# Patient Record
Sex: FEMALE | Race: WHITE | NOT HISPANIC OR LATINO | Employment: UNEMPLOYED | URBAN - METROPOLITAN AREA
[De-identification: names, ages, dates, MRNs, and addresses within clinical notes are randomized per-mention and may not be internally consistent; named-entity substitution may affect disease eponyms.]

---

## 2017-03-07 ENCOUNTER — OFFICE VISIT (OUTPATIENT)
Dept: RETAIL CLINIC | Facility: CLINIC | Age: 22
End: 2017-03-07

## 2017-03-07 VITALS
RESPIRATION RATE: 18 BRPM | DIASTOLIC BLOOD PRESSURE: 78 MMHG | SYSTOLIC BLOOD PRESSURE: 118 MMHG | TEMPERATURE: 98.5 F | HEART RATE: 104 BPM | OXYGEN SATURATION: 98 %

## 2017-03-07 DIAGNOSIS — H65.01 RIGHT ACUTE SEROUS OTITIS MEDIA, RECURRENCE NOT SPECIFIED: Primary | ICD-10-CM

## 2017-03-07 PROCEDURE — 99213 OFFICE O/P EST LOW 20 MIN: CPT | Performed by: NURSE PRACTITIONER

## 2017-03-07 RX ORDER — AMOXICILLIN 875 MG/1
875 TABLET, COATED ORAL 2 TIMES DAILY
Qty: 20 TABLET | Refills: 0 | Status: SHIPPED | OUTPATIENT
Start: 2017-03-07 | End: 2017-03-17

## 2017-03-07 NOTE — PROGRESS NOTES
Subjective   Aleida Cano is a 21 y.o. female. Presenting with left ear/fever (101) ache since last evening. Mentions that she began to have sore throat, and nasal congestion today. Has not taken any OTC medications for symptoms.     History of Present Illness     The following portions of the patient's history were reviewed and updated as appropriate: allergies, current medications, past family history, past medical history, past social history, past surgical history and problem list.    Review of Systems   Constitutional: Positive for fever (low grade). Negative for appetite change, chills, diaphoresis and fatigue.   HENT: Positive for congestion (nasal), ear pain (left ) and sore throat. Negative for postnasal drip and rhinorrhea.    Respiratory: Positive for cough (mild; mostly in evening).        Objective   Physical Exam   Constitutional: She appears well-developed and well-nourished.   HENT:   Head: Normocephalic.   Right Ear: Tympanic membrane is erythematous and bulging.   Left Ear: Tympanic membrane normal.   Nose: Mucosal edema and rhinorrhea present. Right sinus exhibits no maxillary sinus tenderness and no frontal sinus tenderness. Left sinus exhibits no maxillary sinus tenderness and no frontal sinus tenderness.   Moderate amount of cerumen present    Cardiovascular: Normal rate and regular rhythm.    Pulmonary/Chest: Effort normal and breath sounds normal.   Lymphadenopathy:        Head (right side): No submandibular adenopathy present.        Head (left side): No submandibular adenopathy present.     She has no cervical adenopathy.   Skin: Skin is warm, dry and intact.       Assessment/Plan   Aleida was seen today for earache, sore throat and nasal congestion.    Diagnoses and all orders for this visit:    Right acute serous otitis media, recurrence not specified    Other orders  -     loratadine-pseudoephedrine (CLARITIN-D 12 HOUR) 5-120 MG per 12 hr tablet; Take 1 tablet by mouth 2 (Two) Times  a Day for 5 days.  -     amoxicillin (AMOXIL) 875 MG tablet; Take 1 tablet by mouth 2 (Two) Times a Day for 10 days.

## 2017-06-28 ENCOUNTER — OFFICE VISIT (OUTPATIENT)
Dept: FAMILY MEDICINE CLINIC | Facility: CLINIC | Age: 22
End: 2017-06-28

## 2017-06-28 VITALS
HEIGHT: 63 IN | OXYGEN SATURATION: 98 % | BODY MASS INDEX: 51.91 KG/M2 | SYSTOLIC BLOOD PRESSURE: 128 MMHG | HEART RATE: 102 BPM | WEIGHT: 293 LBS | DIASTOLIC BLOOD PRESSURE: 76 MMHG

## 2017-06-28 DIAGNOSIS — Z30.41 ENCOUNTER FOR SURVEILLANCE OF CONTRACEPTIVE PILLS: Primary | ICD-10-CM

## 2017-06-28 DIAGNOSIS — Z23 NEED FOR HPV VACCINE: ICD-10-CM

## 2017-06-28 DIAGNOSIS — Z31.69 ENCOUNTER FOR PRECONCEPTION CONSULTATION: ICD-10-CM

## 2017-06-28 DIAGNOSIS — E66.01 MORBID OBESITY DUE TO EXCESS CALORIES (HCC): ICD-10-CM

## 2017-06-28 DIAGNOSIS — Z87.42 HISTORY OF IRREGULAR MENSTRUAL CYCLES: ICD-10-CM

## 2017-06-28 DIAGNOSIS — Z12.4 PAP SMEAR FOR CERVICAL CANCER SCREENING: ICD-10-CM

## 2017-06-28 LAB
ALBUMIN SERPL-MCNC: 4.2 G/DL (ref 3.2–4.8)
ALBUMIN/GLOB SERPL: 1.7 G/DL (ref 1.5–2.5)
ALP SERPL-CCNC: 132 U/L (ref 25–100)
ALT SERPL W P-5'-P-CCNC: 53 U/L (ref 7–40)
ANION GAP SERPL CALCULATED.3IONS-SCNC: 10 MMOL/L (ref 3–11)
ARTICHOKE IGE QN: 150 MG/DL (ref 0–130)
AST SERPL-CCNC: 37 U/L (ref 0–33)
B-HCG UR QL: NEGATIVE
BASOPHILS # BLD AUTO: 0.02 10*3/MM3 (ref 0–0.2)
BASOPHILS NFR BLD AUTO: 0.2 % (ref 0–1)
BILIRUB BLD-MCNC: NEGATIVE MG/DL
BILIRUB SERPL-MCNC: 0.4 MG/DL (ref 0.3–1.2)
BUN BLD-MCNC: <5 MG/DL (ref 9–23)
BUN/CREAT SERPL: ABNORMAL (ref 7–25)
CALCIUM SPEC-SCNC: 9.7 MG/DL (ref 8.7–10.4)
CHLORIDE SERPL-SCNC: 103 MMOL/L (ref 99–109)
CHOLEST SERPL-MCNC: 194 MG/DL (ref 0–200)
CLARITY, POC: CLEAR
CO2 SERPL-SCNC: 27 MMOL/L (ref 20–31)
COLOR UR: YELLOW
CREAT BLD-MCNC: 0.7 MG/DL (ref 0.6–1.3)
DEPRECATED RDW RBC AUTO: 49.2 FL (ref 37–54)
EOSINOPHIL # BLD AUTO: 0.25 10*3/MM3 (ref 0–0.3)
EOSINOPHIL NFR BLD AUTO: 2.9 % (ref 0–3)
ERYTHROCYTE [DISTWIDTH] IN BLOOD BY AUTOMATED COUNT: 14.4 % (ref 11.3–14.5)
FSH SERPL-ACNC: 6.2 MIU/ML
GFR SERPL CREATININE-BSD FRML MDRD: 104 ML/MIN/1.73
GLOBULIN UR ELPH-MCNC: 2.5 GM/DL
GLUCOSE BLD-MCNC: 97 MG/DL (ref 70–100)
GLUCOSE UR STRIP-MCNC: NEGATIVE MG/DL
HAV IGM SERPL QL IA: NORMAL
HBA1C MFR BLD: 5.3 % (ref 4.8–5.6)
HBV CORE IGM SERPL QL IA: NORMAL
HBV SURFACE AG SERPL QL IA: NORMAL
HCT VFR BLD AUTO: 44.8 % (ref 34.5–44)
HCV AB SER DONR QL: NORMAL
HDLC SERPL-MCNC: 41 MG/DL (ref 40–60)
HGB BLD-MCNC: 14.2 G/DL (ref 11.5–15.5)
HIV1+2 AB SER QL: NORMAL
IMM GRANULOCYTES # BLD: 0.01 10*3/MM3 (ref 0–0.03)
IMM GRANULOCYTES NFR BLD: 0.1 % (ref 0–0.6)
INTERNAL NEGATIVE CONTROL: NEGATIVE
INTERNAL POSITIVE CONTROL: POSITIVE
KETONES UR QL: NEGATIVE
LEUKOCYTE EST, POC: NEGATIVE
LH SERPL-ACNC: 8 MIU/ML
LYMPHOCYTES # BLD AUTO: 2.14 10*3/MM3 (ref 0.6–4.8)
LYMPHOCYTES NFR BLD AUTO: 24.9 % (ref 24–44)
Lab: 0
MCH RBC QN AUTO: 29.3 PG (ref 27–31)
MCHC RBC AUTO-ENTMCNC: 31.7 G/DL (ref 32–36)
MCV RBC AUTO: 92.4 FL (ref 80–99)
MONOCYTES # BLD AUTO: 0.53 10*3/MM3 (ref 0–1)
MONOCYTES NFR BLD AUTO: 6.2 % (ref 0–12)
NEUTROPHILS # BLD AUTO: 5.64 10*3/MM3 (ref 1.5–8.3)
NEUTROPHILS NFR BLD AUTO: 65.7 % (ref 41–71)
NITRITE UR-MCNC: NEGATIVE MG/ML
PH UR: 5 [PH] (ref 5–8)
PLATELET # BLD AUTO: 302 10*3/MM3 (ref 150–450)
PMV BLD AUTO: 10.3 FL (ref 6–12)
POTASSIUM BLD-SCNC: 4.5 MMOL/L (ref 3.5–5.5)
PROT SERPL-MCNC: 6.7 G/DL (ref 5.7–8.2)
PROT UR STRIP-MCNC: NEGATIVE MG/DL
RBC # BLD AUTO: 4.85 10*6/MM3 (ref 3.89–5.14)
RBC # UR STRIP: NEGATIVE /UL
SODIUM BLD-SCNC: 140 MMOL/L (ref 132–146)
SP GR UR: 1.02 (ref 1–1.03)
TRIGL SERPL-MCNC: 88 MG/DL (ref 0–150)
TSH SERPL DL<=0.05 MIU/L-ACNC: 5.33 MIU/ML (ref 0.35–5.35)
UROBILINOGEN UR QL: NORMAL
WBC NRBC COR # BLD: 8.59 10*3/MM3 (ref 3.5–10.8)

## 2017-06-28 PROCEDURE — 84443 ASSAY THYROID STIM HORMONE: CPT | Performed by: FAMILY MEDICINE

## 2017-06-28 PROCEDURE — 83002 ASSAY OF GONADOTROPIN (LH): CPT | Performed by: FAMILY MEDICINE

## 2017-06-28 PROCEDURE — 99214 OFFICE O/P EST MOD 30 MIN: CPT | Performed by: FAMILY MEDICINE

## 2017-06-28 PROCEDURE — 83036 HEMOGLOBIN GLYCOSYLATED A1C: CPT | Performed by: FAMILY MEDICINE

## 2017-06-28 PROCEDURE — 82627 DEHYDROEPIANDROSTERONE: CPT | Performed by: FAMILY MEDICINE

## 2017-06-28 PROCEDURE — 90649 4VHPV VACCINE 3 DOSE IM: CPT | Performed by: FAMILY MEDICINE

## 2017-06-28 PROCEDURE — 81025 URINE PREGNANCY TEST: CPT | Performed by: FAMILY MEDICINE

## 2017-06-28 PROCEDURE — 80061 LIPID PANEL: CPT | Performed by: FAMILY MEDICINE

## 2017-06-28 PROCEDURE — 83001 ASSAY OF GONADOTROPIN (FSH): CPT | Performed by: FAMILY MEDICINE

## 2017-06-28 PROCEDURE — 90471 IMMUNIZATION ADMIN: CPT | Performed by: FAMILY MEDICINE

## 2017-06-28 PROCEDURE — 85025 COMPLETE CBC W/AUTO DIFF WBC: CPT | Performed by: FAMILY MEDICINE

## 2017-06-28 PROCEDURE — 80053 COMPREHEN METABOLIC PANEL: CPT | Performed by: FAMILY MEDICINE

## 2017-06-28 PROCEDURE — 81003 URINALYSIS AUTO W/O SCOPE: CPT | Performed by: FAMILY MEDICINE

## 2017-06-28 PROCEDURE — 84402 ASSAY OF FREE TESTOSTERONE: CPT | Performed by: FAMILY MEDICINE

## 2017-06-28 PROCEDURE — 80074 ACUTE HEPATITIS PANEL: CPT | Performed by: FAMILY MEDICINE

## 2017-06-28 PROCEDURE — G0432 EIA HIV-1/HIV-2 SCREEN: HCPCS | Performed by: FAMILY MEDICINE

## 2017-06-28 PROCEDURE — 86592 SYPHILIS TEST NON-TREP QUAL: CPT | Performed by: FAMILY MEDICINE

## 2017-06-28 RX ORDER — NORGESTIMATE AND ETHINYL ESTRADIOL 0.25-0.035
1 KIT ORAL DAILY
Qty: 28 TABLET | Refills: 3 | Status: SHIPPED | OUTPATIENT
Start: 2017-06-28

## 2017-06-28 NOTE — PROGRESS NOTES
"Subjective   Aleida Cano is a 22 y.o. female.  Pt is here for refills on birth control. No previous pap.    Contraception   This is a recurrent (initial rx 1 year ago,  did not follow up for recommended pap at 6 months.  No prior pap.  no hx blood clot.  No tobacco. ) problem. The current episode started more than 1 year ago. The problem occurs rarely. The problem has been resolved. Pertinent negatives include no abdominal pain, arthralgias, change in bowel habit, congestion, diaphoresis, fever, headaches, numbness, rash, swollen glands or weakness. Nothing aggravates the symptoms. She has tried nothing for the symptoms. The treatment provided moderate relief.      She was started on ocp 2013 after HS graduation due to heavy frequent and irreg menses.  She would bleed for weeks then go without menses for months. shayan well.      She reports interest in conceiving.  Specifically she asks if she desires pregnancy if she needs to dc ocp.  Discussed and counseled on menstrual cycle,when ovulation occurs, and Sx PCOS.      The following portions of the patient's history were reviewed and updated as appropriate: allergies, current medications, past family history, past medical history, past social history, past surgical history and problem list.    Review of Systems   Constitutional: Negative for diaphoresis and fever.   HENT: Negative for congestion.    Gastrointestinal: Negative for abdominal pain and change in bowel habit.   Musculoskeletal: Negative for arthralgias.   Skin: Negative for rash.   Neurological: Negative for weakness, numbness and headaches.       Objective     Vitals:    06/28/17 1031   BP: 128/76   Pulse: 102   SpO2: 98%   Weight: (!) 301 lb (137 kg)   Height: 63\" (160 cm)       Physical Exam   Constitutional: She is oriented to person, place, and time. She appears well-developed and well-nourished.   Obese, nad   HENT:   Head: Normocephalic and atraumatic.   Eyes: EOM are normal. Pupils are equal, " round, and reactive to light. Right eye exhibits no discharge. Left eye exhibits no discharge.   Neck: Normal range of motion. Neck supple.   Cardiovascular: Normal rate, regular rhythm, normal heart sounds and intact distal pulses.    Pulmonary/Chest: Effort normal and breath sounds normal.   Abdominal: Soft. Bowel sounds are normal. She exhibits no mass. There is no tenderness.   Musculoskeletal: Normal range of motion.        Right shoulder: She exhibits no swelling.   Neurological: She is alert and oriented to person, place, and time. She has normal reflexes.   Skin: Skin is warm and dry. No cyanosis. Nails show no clubbing.   Psychiatric: She has a normal mood and affect. Her behavior is normal. Judgment and thought content normal.   Nursing note and vitals reviewed.      Assessment/Plan     Problem List Items Addressed This Visit        Digestive    Morbid obesity    Current Assessment & Plan     Obesity is unchanged.  Discussed the patient's BMI.  The BMI is above average; BMI management plan is completed.  General weight loss/lifestyle modification strategies discussed (elicit support from others; identify saboteurs; non-food rewards, etc).         Relevant Orders    CBC & Differential    TSH    Comprehensive Metabolic Panel    Hemoglobin A1c    Lipid Panel    HIV-1 / O / 2 Ag / Antibody 4th Generation    Hepatitis Panel, Acute    RPR    Follicle stimulating hormone    DHEA-sulfate    Luteinizing hormone    Testosterone Free Direct    CBC Auto Differential       Other    Encounter for surveillance of contraceptive pills - Primary    Relevant Medications    norgestimate-ethinyl estradiol (SPRINTEC 28) 0.25-35 MG-MCG per tablet    Other Relevant Orders    Ambulatory Referral to Gynecology    HPV Vaccine QuadriValent 3 Dose IM (Completed)    History of irregular menstrual cycles    Relevant Orders    CBC & Differential    TSH    Comprehensive Metabolic Panel    Hemoglobin A1c    Lipid Panel    HIV-1 / O / 2 Ag  / Antibody 4th Generation    Hepatitis Panel, Acute    RPR    Follicle stimulating hormone    DHEA-sulfate    Luteinizing hormone    Testosterone Free Direct    POC Urinalysis Dipstick, Automated (Completed)    POC Pregnancy, Urine (Completed)    HPV Vaccine QuadriValent 3 Dose IM (Completed)    CBC Auto Differential    Encounter for preconception consultation    Relevant Medications    Prenatal-FE Bis-FA-DHA w/o A (COMPLETE PRENATAL/DHA) 30-0.975 & 300 MG misc    Other Relevant Orders    HIV-1 / O / 2 Ag / Antibody 4th Generation    Hepatitis Panel, Acute    RPR    Pap smear for cervical cancer screening    Current Assessment & Plan     Refer gyn.           Relevant Orders    HPV Vaccine QuadriValent 3 Dose IM (Completed)    Need for HPV vaccine    Overview     #1 6/28/17             Greater than 30 minutes were spent with patient, with greater than 90% of time spent in face-to-face communication.  Refer gyn for initial pap.  Labs for pcos and obesity.   She is fasting today  Start PNV.  Avoid tobacco, etoh, nsaids.   HPV #1 today-fu 1-2 mo and 6 mo for injections #2 and #3  Fu 2 mo for gardasil #2 and Annual Physical.

## 2017-06-28 NOTE — PATIENT INSTRUCTIONS
Prenatal Vitamin and Mineral Combinations (oral solid dosage forms)  What is this medicine?  PRENATAL VITAMIN AND MINERAL combinations are used before, during, and after pregnancy to help provide provide good nutrition.  This medicine may be used for other purposes; ask your health care provider or pharmacist if you have questions.  COMMON BRAND NAME(S): Active OB, Advanced Care Plus, Advanced NatalCare, Advanced-RF NatalCare, Aminate Fe, Anemagen OB, B-Nexa, BP FoliNatal Plus B, BP MultiNatal Plus, BP MultiNatal Plus Chewable, BP Prenate, Brainstrong, Bright Beginnings Prenatal, Tito-Kain, Calcium PNV, CareNatal DHA, CareNate 600, Cavan One Dixon, Cavan Prenatal with EC Calcium, Cavan-Alpha, Cavan-EC SOD DHA, Cavan-Heme OB, Cavan-Heme Omega, Cenogen Ultra, Centrum Specialist Prenatal, CertaVite with Antioxidants, Choice-OB + DHA, Citracal Prenatal, Citracal Prenatal + DHA, CitraNatal 90 DHA, CitraNatal Assure, CitraNatal B-Calm, CitraNatal DHA, CitraNatal Drake, CitraNatal Rx, Classic Prenatal, ComBi Rx, Complete Sejal DHA, Complete-RF, CompleteNate, Concept DHA, Concept OB, CoreNate-DHA, Corvite FE with Quatrefolic, CRNatal DHA, Daily Vitamin, Docosavit, Duet, Duet Chewable, Duet DHA, Duet , Duet DHA 430ec, Duet DHA Balanced, Duet DHA Complete, Duet DHA Complete Gluten Free, Duet DHA EC, Duet DHA Ferrazone, EC Omega-3, DuoVit DHA, Edge OB, Elite OB, Elite OB with DHA, Elite-, Extra-Virt Plus, Femecal OB, Femecal OB Plus DHA, Ferrocite Plus, Focalgin 90 DHA, Focalgin CA, Folbecal, Folcal DHA, Folcaps Care One, Folcaps Omega 3, Folet One with Quatrefolic, Folivane OB, Folivane-EC Calcium DHA NF, Foltabs 90 Plus DHA, Foltabs Prenatal, Foltabs Prenatal Plus DHA, Gesticare, Gesticare DHA, Gesticare DHA Delayed-Release, HemeNatal OB, HemeNatal OB + DHA, Hemocyte Plus, HIP Prenatal, ICAR Prenatal Rx, iNatal Advance, iNatal GT, iNatal Ultra, Infanate Balance, Infanate DHA, Infanate Plus, Kolnatal,  Lactocal-F, Levomefolate PNV, MACNATAL CN DHA, Marnatal-F, Marnatal-F Plus, Materna, Maxinate, [place] Prenatal, [place] Prenatal F.A., [place] Prenatal H.P., Mom's Choice Rx, Multi-Kain 30, Multi-Kain 30 DHA, Multi-Kain DHA Extra, Multifol Plus, Multivitamin With Minerals, MyNatal OB Prenatal, NataCaps, NataChew, NataFolic-OB, Natafort, -V RX, NatalCare CFe 60, NatalCare GlossTabs, NatalCare PIC, NatalCare PIC Forte, NatalCare Plus, NatalCare Rx, NatalCare Three, NATALVIRT 90 DHA, NATALVIRT CA, NataTab CFe, NataTab FA, Natatab Rx, Natelle, Natelle C, Natelle One, Natelle Plus with DHA, Natelle Prefer, Natelle-ez, Navatab + DHA, Neevo, Neevo DHA, Nestabs, Nestabs ABC, Nestabs CBF, Nestabs DHA, Nestabs FA, Nestabs Rx, New Advanced Formula Prenatal Z, Nexa Plus, Nexa Select, Niferex-PN, Niferex-PN Forte, Niva-Plus, NovaNatal, NovaStart, Nu-Sejal, NutraCare, Nutri-Tab OB, Nutri-Tab OB + DHA, Nutrinate, NutriSpire, O-Tito F.A., O-Tito Prenatal, OB Choice, OB Complete, OB Complete 400, OB Complete One, OB Complete Petite, OB Complete Premier, OB Complete with DHA, OB- One, Obstetrix EC Prenatal, Obstetrix-100, Obtrex, Obtrex DHA, One-A-Day Women's, One-A-Day Women's Prenatal, OptiNate, Paire OB Tablet Plus DHA, PNV OB + DHA, PNV Prenatal, PNV Prenatal Plus Multivitamin, PNV Tabs 29-1, PNV-DHA, PNV-DHA + Docusate, PNV-DHA Plus, PNV-First, PNV-Iron, PNV-OB with DHA, PNV-Omega, PNV-Select, PNV-Total with DHA, PNV--U, NJ Sejal 400, NJ  400ec, NJ  430, NJ Sejal 430ec, NJ Sejal 440ec, PreCare, PreferaOB, PreferaOB + DHA, PreferaOB One, Premesis Rx, Prena1 MANINDER, Prena1 Plus, Prena1 True, PrenaCare, Prenafirst, Prenaissance, Prenaissance 90 DHA, Prenaissance Balance, Prenaissance DHA, Prenaissance [place] DHA, Prenaissance Next, Prenaissance Plus, Prenaissance Promise, PrenaPlus, Prenat with Quatrefolic, PreNata Multivitamin with Iron, Prenatabs CBF, Prenatabs FA, Prenatabs OBN, Prenatabs RX, Prenatal,  Prenatal 1 Plus 1, Prenatal 19, Prenatal AD, Prenatal Formula 3, Prenatal H, Prenatal Low Iron, Prenatal MR 90 Fe, Prenatal MTR with Selenium, Prenatal Multivitamin + DHA 2, Prenatal Nina Advance, Prenatal Plus, Prenatal Plus Iron, Prenatal Plus Low Iron, Prenatal Rx with Beta Carotene, Prenatal S, Prenatal U, Prenatal Vitamin, PreNatal Vitamins Plus, Prenate Advance, Prenate AM with Quatrefolic, Prenate DHA, Prenate Elite, Prenate Enhance with Quatrefolic, Prenate Essential, Prenate GT, Prenate Mini, PreNate Plus, Prenate Restore with Quatrefolic, Prenate Star, Prenate Ultra, Prenavite, Prenavite Protein, PreNexa, PreNexa Premier, PrePLUS, PreQue, PreTAB, Previte Rx, PrimaCare, PrimaCare Advantage, PrimaCare ONE, Provida OB, PruEt DHA, PruEt DHAec, PureFe OB Plus, PureFe Plus, PureVit DualFe Plus, RE DualVit OB, RE DualVit Plus, RE OB + DHA, RE OB 90 + DHA, RE Prenatal, RE PreVit + DHA, RE-Selena 29, RE-Selena 29 OB, Reaphrim, Liliam, Liliam DHA, Liliam DHA Extra, REocyte Plus, Right Step, Dudley-Nv, Dudley-Nv DHA, Rulavite, Se-Care, Se-Care Conceive, Se-Care Gesture, Se-Sejal 19, Se-Sejal 19 Chewable, Se- 90, Se-Sejal ONE, Se-Plete DHA, Se-Tan DHA, Se-Tan Plus, Select-OB, Select-OB + DHA, SetonET, SetonET-EC DHA, StrongStart, StrongStart Chewable Tablet, Atilio One, Atilio Prenatal + DHA, Stuartnatal Plus 3, Tandem DHA, Tandem OB, Tandem Plus, Jesus A Prenatal Pack with DHA, Jesus EC Calcium DHA Pack, Jesus Prenatal with DHA, Jesus-C DHA, Jesus-EC Tito, Jesus-Prex Prenatal with DHA, Thera  Complete, Thera Sejal Core Nutrition, Thera  Lactation Support, Thera  OvaVite, Thera  Plus, Thera-Tabs, Thrivite, TL-Care DHA, TL-Select, TL-Select DHA, Tri Rx, TriAdvance, ,  Prenatal DHA ONE, Trifera OB, Trimesis Rx, Trinatal GT, Trinatal Rx 1, Trinate, TriStart DHA, Triveen-Duo DHA, Triveen-PRx RNF, Triveen-Ten, Trust Sejal DHA, UltimateCare Advantage, UltimateCare Combo, UltimateCare ONE,  UltimateCare ONE NF, Ultra NatalCare, VemaVite-PRx 2, Vena-Bal DHA, Venatal-FA, Verotin-BY, Verotin-GR, Vinacal, Vinacal B, Vinatal Forte, Vinate 90, Vinate AZ, Vinate AZ Extra, Vinate C, Vinate Calcium, Vinate Care, Vinate DHA, Vinate GT, Vinate IC, Vinate II, Vinate III, Vinate M Low Iron, Vinate One, Vinate PN, Vinate Ultra, Virt-Advance, Virt-C DHA, Virt-Care One, Virt-Kain, Virt-PN, Virt-PN DHA, Virt-PN Plus, Virt-Select, Virt-Patrizia GT, VirtPrex, Vitafol PN, Vitafol Ultra, Vitafol-Joceline Prenatal, Vitafol-OB, Vitafol-OB + DHA, Vitafol-OB and DHA, Vitafol-One, VitaMed MD Plus Rx, VitaNatal OB Plus DHA, vitaPearl Prenatal, VitaPhil, VitaPhil + DHA, VitaPhil + DHA 90, VitaPhil AiDE, VitaSpire, Viva CT, VIVA DHA, Vol-Tab Rx,  CH Ultra, -CH-PNV, -GGR-B6 Prenatal, -HEME One, -PNV-DHA, Zatean-CH, Zatean-Pn, Zatean-Pn DHA, Zatean-Pn Plus, Zingiber  What should I tell my health care provider before I take this medicine?  They need to know if you have any of these conditions:  -bleeding or clotting disorder  -history of anemia of any type  -other chronic health condition  -an unusual or allergic reaction to vitamins, minerals, other medicines, foods, dyes, or preservatives  How should I use this medicine?  Take this medicine by mouth with a glass of water. You can take it with or without food. If it upsets your stomach, take it with food. Chewable prenatal vitamin tablets may be chewed completely before swallowing. Follow the directions on the prescription label. The usual dose is taken once a day. Do not take your medicine more often than directed.  Contact your pediatrician regarding the use of this medicine in children. Special care may be needed. This medicine is intended for females who are pregnant, breast-feeding, or may become pregnant.  Overdosage: If you think you have taken too much of this medicine contact a poison control center or emergency room at once.  NOTE: This medicine is only for you. Do not  share this medicine with others.  What if I miss a dose?  If you miss a dose, take it as soon as you can. If it is almost time for your next dose, take only that dose. Do not take double or extra doses.  What may interact with this medicine?  -alendronate  -antacids  -cefdinir  -cefditoren  -etidronate  -fluoroquinolone antibiotics (examples: ciprofloxacin, gatifloxacin, levofloxacin)  -ibandronate  -levodopa  -risedronate  -tetracycline antibiotics (examples: doxycycline, minocycline, tetracycline)  -thyroid hormones  -warfarin  This list may not describe all possible interactions. Give your health care provider a list of all the medicines, herbs, non-prescription drugs, or dietary supplements you use. Also tell them if you smoke, drink alcohol, or use illegal drugs. Some items may interact with your medicine.  What should I watch for while using this medicine?  See your health care professional for regular checks on your progress. Remember that vitamin and mineral supplements do not replace the need for good nutrition from a balanced diet.  Stools commonly change color when vitamins and minerals are taken. Notify your health care professional if this change is alarming or accompanied by other symptoms, like abdominal pain.  What side effects may I notice from receiving this medicine?  Side effects that you should report to your doctor or health care professional as soon as possible:  -allergic reaction such as skin rash or difficulty breathing  -vomiting  Side effects that usually do not require medical attention (report to your doctor or health care professional if they continue or are bothersome):  -nausea  -stomach upset  This list may not describe all possible side effects. Call your doctor for medical advice about side effects. You may report side effects to FDA at 9-832-FDA-2147.  Where should I keep my medicine?  Keep out of the reach of children.  Most vitamins and minerals should be stored at controlled  room temperature. Check your specific product directions. Protect from heat and moisture. Throw away any unused medicine after the expiration date.  NOTE: This sheet is a summary. It may not cover all possible information. If you have questions about this medicine, talk to your doctor, pharmacist, or health care provider.     © 2017, [employer]. (2016-07-14 09:02:38)  Prenatal Care  WHAT IS PRENATAL CARE?   Prenatal care is the process of caring for a pregnant woman before she gives birth. Prenatal care makes sure that she and her baby remain as healthy as possible throughout pregnancy. Prenatal care may be provided by a midwife, family practice health care provider, or a childbirth and pregnancy specialist (obstetrician). Prenatal care may include physical examinations, testing, treatments, and education on nutrition, lifestyle, and social support services.  WHY IS PRENATAL CARE SO IMPORTANT?   Early and consistent prenatal care increases the chance that you and your baby will remain healthy throughout your pregnancy. This type of care also decreases a baby's risk of being born too early (prematurely), or being born smaller than expected (small for gestational age). Any underlying medical conditions you may have that could pose a risk during your pregnancy are discussed during prenatal care visits. You will also be monitored regularly for any new conditions that may arise during your pregnancy so they can be treated quickly and effectively.  WHAT HAPPENS DURING PRENATAL CARE VISITS?  Prenatal care visits may include the following:  Discussion  Tell your health care provider about any new signs or symptoms you have experienced since your last visit. These might include:  · Nausea or vomiting.  · Increased or decreased level of energy.  · Difficulty sleeping.  · Back or leg pain.  · Weight changes.  · Frequent urination.  · Shortness of breath with physical activity.  · Changes in your skin, such as the  development of a rash or itchiness.  · Vaginal discharge or bleeding.  · Feelings of excitement or nervousness.  · Changes in your baby's movements.  You may want to write down any questions or topics you want to discuss with your health care provider and bring them with you to your appointment.  Examination  During your first prenatal care visit, you will likely have a complete physical exam. Your health care provider will often examine your vagina, cervix, and the position of your uterus, as well as check your heart, lungs, and other body systems. As your pregnancy progresses, your health care provider will measure the size of your uterus and your baby's position inside your uterus. He or she may also examine you for early signs of labor. Your prenatal visits may also include checking your blood pressure and, after about 10-12 weeks of pregnancy, listening to your baby's heartbeat.  Testing  Regular testing often includes:  · Urinalysis. This checks your urine for glucose, protein, or signs of infection.  · Blood count. This checks the levels of white and red blood cells in your body.  · Tests for sexually transmitted infections (STIs). Testing for STIs at the beginning of pregnancy is routinely done and is required in many states.  · Antibody testing. You will be checked to see if you are immune to certain illnesses, such as rubella, that can affect a developing fetus.  · Glucose screen. Around 24-28 weeks of pregnancy, your blood glucose level will be checked for signs of gestational diabetes. Follow-up tests may be recommended.  · Group B strep. This is a bacteria that is commonly found inside a woman's vagina. This test will inform your health care provider if you need an antibiotic to reduce the amount of this bacteria in your body prior to labor and childbirth.  · Ultrasound. Many pregnant women undergo an ultrasound screening around 18-20 weeks of pregnancy to evaluate the health of the fetus and check for  any developmental abnormalities.  · HIV (human immunodeficiency virus) testing. Early in your pregnancy, you will be screened for HIV. If you are at high risk for HIV, this test may be repeated during your third trimester of pregnancy.  You may be offered other testing based on your age, personal or family medical history, or other factors.   HOW OFTEN SHOULD I PLAN TO SEE MY HEALTH CARE PROVIDER FOR PRENATAL CARE?  Your prenatal care check-up schedule depends on any medical conditions you have before, or develop during, your pregnancy. If you do not have any underlying medical conditions, you will likely be seen for checkups:  · Monthly, during the first 6 months of pregnancy.  · Twice a month during months 7 and 8 of pregnancy.  · Weekly starting in the 9th month of pregnancy and until delivery.  If you develop signs of early labor or other concerning signs or symptoms, you may need to see your health care provider more often. Ask your health care provider what prenatal care schedule is best for you.  WHAT CAN I DO TO KEEP MYSELF AND MY BABY AS HEALTHY AS POSSIBLE DURING MY PREGNANCY?  · Take a prenatal vitamin containing 400 micrograms (0.4 mg) of folic acid every day. Your health care provider may also ask you to take additional vitamins such as iodine, vitamin D, iron, copper, and zinc.  · Take 6717-2323 mg of calcium daily starting at your 20th week of pregnancy until you deliver your baby.  · Make sure you are up to date on your vaccinations. Unless directed otherwise by your health care provider:    You should receive a tetanus, diphtheria, and pertussis (Tdap) vaccination between the 27th and 36th week of your pregnancy, regardless of when your last Tdap immunization occurred. This helps protect your baby from whooping cough (pertussis) after he or she is born.    You should receive an annual inactivated influenza vaccine (IIV) to help protect you and your baby from influenza. This can be done at any point  during your pregnancy.  · Eat a well-rounded diet that includes:    Fresh fruits and vegetables.    Lean proteins.    Calcium-rich foods such as milk, yogurt, hard cheeses, and dark, leafy greens.    Whole grain breads.  · Do not eat seafood high in mercury, including:    Swordfish.    Tilefish.    Shark.    [place] mackerel.    More than 6 oz tuna per week.  · Do not eat:    Raw or undercooked meats or eggs.    Unpasteurized foods, such as soft cheeses (brie, blue, or feta), juices, and milks.    Lunch meats.    Hot dogs that have not been heated until they are steaming.  · Drink enough water to keep your urine clear or pale yellow. For many women, this may be 10 or more 8 oz glasses of water each day. Keeping yourself hydrated helps deliver nutrients to your baby and may prevent the start of pre-term uterine contractions.  · Do not use any tobacco products including cigarettes, chewing tobacco, or electronic cigarettes. If you need help quitting, ask your health care provider.  · Do not drink beverages containing alcohol. No safe level of alcohol consumption during pregnancy has been determined.  · Do not use any illegal drugs. These can harm your developing baby or cause a miscarriage.  · Ask your health care provider or pharmacist before taking any prescription or over-the-counter medicines, herbs, or supplements.  · Limit your caffeine intake to no more than 200 mg per day.  · Exercise. Unless told otherwise by your health care provider, try to get 30 minutes of moderate exercise most days of the week. Do not  do high-impact activities, contact sports, or activities with a high risk of falling, such as horseback riding or downhill skiing.  · Get plenty of rest.  · Avoid anything that raises your body temperature, such as hot tubs and saunas.  · If you own a cat, do not empty its litter box. Bacteria contained in cat feces can cause an infection called toxoplasmosis. This can result in serious harm to the  fetus.  · Stay away from chemicals such as insecticides, lead, mercury, and cleaning or paint products that contain solvents.  · Do not have any X-rays taken unless medically necessary.  · Take a childbirth and breastfeeding preparation class. Ask your health care provider if you need a referral or recommendation.     This information is not intended to replace advice given to you by your health care provider. Make sure you discuss any questions you have with your health care provider.     Document Released: 12/20/2004 Document Revised: 04/10/2017 Document Reviewed: 03/04/2015  [employer] Interactive Patient Education ©2017 [employer]  Oral Contraception Information  Oral contraceptive pills (OCPs) are medicines taken to prevent pregnancy. OCPs work by preventing the ovaries from releasing eggs. The hormones in OCPs also cause the cervical mucus to thicken, preventing the sperm from entering the uterus. The hormones also cause the uterine lining to become thin, not allowing a fertilized egg to attach to the inside of the uterus. OCPs are highly effective when taken exactly as prescribed. However, OCPs do not prevent sexually transmitted diseases (STDs). Safe sex practices, such as using condoms along with the pill, can help prevent STDs.   Before taking the pill, you may have a physical exam and Pap test. Your health care provider may order blood tests. The health care provider will make sure you are a good candidate for oral contraception. Discuss with your health care provider the possible side effects of the OCP you may be prescribed. When starting an OCP, it can take 2 to 3 months for the body to adjust to the changes in hormone levels in your body.   TYPES OF ORAL CONTRACEPTION  · The combination pill--This pill contains estrogen and progestin (synthetic progesterone) hormones. The combination pill comes in 21-day, 28-day, or 91-day packs. Some types of combination pills are meant to be taken continuously  (365-day pills). With 21-day packs, you do not take pills for 7 days after the last pill. With 28-day packs, the pill is taken every day. The last 7 pills are without hormones. Certain types of pills have more than 21 hormone-containing pills. With 91-day packs, the first 84 pills contain both hormones, and the last 7 pills contain no hormones or contain estrogen only.  · The minipill--This pill contains the progesterone hormone only. The pill is taken every day continuously. It is very important to take the pill at the same time each day. The minipill comes in packs of 28 pills. All 28 pills contain the hormone.    ADVANTAGES OF ORAL CONTRACEPTIVE PILLS  · Decreases premenstrual symptoms.    · Treats menstrual period cramps.    · Regulates the menstrual cycle.    · Decreases a heavy menstrual flow.    · May treat acne, depending on the type of pill.    · Treats abnormal uterine bleeding.    · Treats polycystic ovarian syndrome.    · Treats endometriosis.    · Can be used as emergency contraception.    THINGS THAT CAN MAKE ORAL CONTRACEPTIVE PILLS LESS EFFECTIVE  OCPs can be less effective if:   · You forget to take the pill at the same time every day.    · You have a stomach or intestinal disease that lessens the absorption of the pill.    · You take OCPs with other medicines that make OCPs less effective, such as antibiotics, certain HIV medicines, and some seizure medicines.    · You take  OCPs.    · You forget to restart the pill on day 7, when using the packs of 21 pills.    RISKS ASSOCIATED WITH ORAL CONTRACEPTIVE PILLS   Oral contraceptive pills can sometimes cause side effects, such as:  · Headache.  · Nausea.  · Breast tenderness.  · Irregular bleeding or spotting.  Combination pills are also associated with a small increased risk of:  · Blood clots.  · Heart attack.  · Stroke.     This information is not intended to replace advice given to you by your health care provider. Make sure you discuss any  questions you have with your health care provider.     Document Released: 03/09/2004 Document Revised: 04/10/2017 Document Reviewed: 06/08/2014  [employer] Interactive Patient Education ©2017 [employer]  Polycystic Ovarian Syndrome  Polycystic ovarian syndrome (PCOS) is a common hormonal disorder among women of reproductive age. Most women with PCOS grow many small cysts on their ovaries. PCOS can cause problems with your periods and make it difficult to get pregnant. It can also cause an increased risk of miscarriage with pregnancy. If left untreated, PCOS can lead to serious health problems, such as diabetes and heart disease.  CAUSES  The cause of PCOS is not fully understood, but genetics may be a factor.  SIGNS AND SYMPTOMS   · Infrequent or no menstrual periods.    · Inability to get pregnant (infertility) because of not ovulating.    · Increased growth of hair on the face, chest, stomach, back, thumbs, thighs, or toes.    · Acne, oily skin, or dandruff.    · Pelvic pain.    · Weight gain or obesity, usually carrying extra weight around the waist.    · Type 2 diabetes.     · High cholesterol.    · High blood pressure.    · Female-pattern baldness or thinning hair.    · Patches of thickened and dark brown or black skin on the neck, arms, breasts, or thighs.    · Tiny excess flaps of skin (skin tags) in the armpits or neck area.    · Excessive snoring and having breathing stop at times while asleep (sleep apnea).    · Deepening of the voice.    · Gestational diabetes when pregnant.    DIAGNOSIS   There is no single test to diagnose PCOS.   · Your health care provider will:      Take a medical history.      Perform a pelvic exam.      Have ultrasonography done.      Check your female and male hormone levels.      Measure glucose or sugar levels in the blood.      Do other blood tests.    · If you are producing too many male hormones, your health care provider will make sure it is from PCOS. At the physical exam,  your health care provider will want to evaluate the areas of increased hair growth. Try to allow natural hair growth for a few days before the visit.    · During a pelvic exam, the ovaries may be enlarged or swollen because of the increased number of small cysts. This can be seen more easily by using vaginal ultrasonography or screening to examine the ovaries and lining of the uterus (endometrium) for cysts. The uterine lining may become thicker if you have not been having a regular period.    TREATMENT   Because there is no cure for PCOS, it needs to be managed to prevent problems. Treatments are based on your symptoms. Treatment is also based on whether you want to have a baby or whether you need contraception.   Treatment may include:   · Progesterone hormone to start a menstrual period.    · Birth control pills to make you have regular menstrual periods.    · Medicines to make you ovulate, if you want to get pregnant.    · Medicines to control your insulin.    · Medicine to control your blood pressure.    · Medicine and diet to control your high cholesterol and triglycerides in your blood.  · Medicine to reduce excessive hair growth.   · Surgery, making small holes in the ovary, to decrease the amount of male hormone production. This is done through a long, lighted tube (laparoscope) placed into the pelvis through a tiny incision in the lower abdomen.    HOME CARE INSTRUCTIONS  · Only take over-the-counter or prescription medicine as directed by your health care provider.  · Pay attention to the foods you eat and your activity levels. This can help reduce the effects of PCOS.    Keep your weight under control.    Eat foods that are low in carbohydrate and high in fiber.    Exercise regularly.  SEEK MEDICAL CARE IF:  · Your symptoms do not get better with medicine.  · You have new symptoms.     This information is not intended to replace advice given to you by your health care provider. Make sure you discuss any  questions you have with your health care provider.     Document Released: 04/13/2006 Document Revised: 10/08/2014 Document Reviewed: 06/05/2014  [employer] Interactive Patient Education ©2017 [employer]  HPV Vaccine Gardasil® (Human Papillomavirus): What You Need to Know  1. What is HPV?  Genital human papillomavirus (HPV) is the most common sexually transmitted virus in the [place]. More than half of sexually active men and women are infected with HPV at some time in their lives.  About 20 million Americans are currently infected, and about 6 million more get infected each year. HPV is usually spread through sexual contact.  Most HPV infections don't cause any symptoms, and go away on their own. But HPV can cause cervical cancer in women. Cervical cancer is the 2nd leading cause of cancer deaths among women around the world. In the [place], about 12,000 women get cervical cancer every year and about 4,000 are expected to die from it.  HPV is also associated with several less common cancers, such as vaginal and vulvar cancers in women, and anal and oropharyngeal (back of the throat, including base of tongue and tonsils) cancers in both men and women. HPV can also cause genital warts and warts in the throat.  There is no cure for HPV infection, but some of the problems it causes can be treated.  2. HPV vaccine: Why get vaccinated?  The HPV vaccine you are getting is one of two vaccines that can be given to prevent HPV. It may be given to both males and females.   This vaccine can prevent most cases of cervical cancer in females, if it is given before exposure to the virus. In addition, it can prevent vaginal and vulvar cancer in females, and genital warts and anal cancer in both males and females.  Protection from HPV vaccine is expected to be long-lasting. But vaccination is not a substitute for cervical cancer screening. Women should still get regular Pap tests.  3. Who should get this HPV vaccine and  when?  HPV vaccine is given as a 3-dose series  · 1st Dose: Now  · 2nd Dose: 1 to 2 months after Dose 1  · 3rd Dose: 6 months after Dose 1  Additional (booster) doses are not recommended.  Routine vaccination  · This HPV vaccine is recommended for girls and boys 11 or 12 years of age. It may be given starting at age 9.  Why is HPV vaccine recommended at 11 or 12 years of age?   HPV infection is easily acquired, even with only one sex partner. That is why it is important to get HPV vaccine before any sexual contact takes place. Also, response to the vaccine is better at this age than at older ages.  Catch-up vaccination  This vaccine is recommended for the following people who have not completed the 3-dose series:   · Females 13 through 26 years of age.  · Males 13 through 21 years of age.  This vaccine may be given to men 22 through 26 years of age who have not completed the 3-dose series.  It is recommended for men through age 26 who have sex with men or whose immune system is weakened because of HIV infection, other illness, or medications.   HPV vaccine may be given at the same time as other vaccines.  4. Some people should not get HPV vaccine or should wait.  · Anyone who has ever had a life-threatening allergic reaction to any component of HPV vaccine, or to a previous dose of HPV vaccine, should not get the vaccine. Tell your doctor if the person getting vaccinated has any severe allergies, including an allergy to yeast.  · HPV vaccine is not recommended for pregnant women. However, receiving HPV vaccine when pregnant is not a reason to consider terminating the pregnancy. Women who are breast feeding may get the vaccine.  · People who are mildly ill when a dose of HPV is planned can still be vaccinated. People with a moderate or severe illness should wait until they are better.  5. What are the risks from this vaccine?  This HPV vaccine has been used in the [place] and around the world for about six years and has  been very safe.  However, any medicine could possibly cause a serious problem, such as a severe allergic reaction. The risk of any vaccine causing a serious injury, or death, is extremely small.  Life-threatening allergic reactions from vaccines are very rare. If they do occur, it would be within a few minutes to a few hours after the vaccination.  Several mild to moderate problems are known to occur with this HPV vaccine. These do not last long and go away on their own.  · Reactions in the arm where the shot was given:    Pain (about 8 people in 10)    Redness or swelling (about 1 person in 4)  · Fever:    Mild (100° F) (about 1 person in 10)    Moderate (102° F) (about 1 person in 65)  · Other problems:    Headache (about 1 person in 3)  · Fainting: Brief fainting spells and related symptoms (such as jerking movements) can happen after any medical procedure, including vaccination. Sitting or lying down for about 15 minutes after a vaccination can help prevent fainting and injuries caused by falls. Tell your doctor if the patient feels dizzy or light-headed, or has vision changes or ringing in the ears.  · Like all vaccines, HPV vaccines will continue to be monitored for unusual or severe problems.  6. What if there is a serious reaction?  What should I look for?  · Look for anything that concerns you, such as signs of a severe allergic reaction, very high fever, or behavior changes.  Signs of a severe allergic reaction can include hives, swelling of the face and throat, difficulty breathing, a fast heartbeat, dizziness, and weakness. These would start a few minutes to a few hours after the vaccination.   What should I do?  · If you think it is a severe allergic reaction or other emergency that can't wait, call 9-1-1 or get the person to the nearest hospital. Otherwise, call your doctor.  · Afterward, the reaction should be reported to the Vaccine Adverse Event Reporting System (VAERS). Your doctor might file this  report, or you can do it yourself through the VAERS web site at www.vaers.[facility].gov, or by calling 1-693.199.2445.  VAERS is only for reporting reactions. They do not give medical advice.  7. The National Vaccine Injury Compensation Program  · The National Vaccine Injury Compensation Program (VICP) is a federal program that was created to compensate people who may have been injured by certain vaccines.  · Persons who believe they may have been injured by a vaccine can learn about the program and about filing a claim by calling 1-250.239.3328 or visiting the VICP website at www.[facility].gov/vaccinecompensation.  8. How can I learn more?  · Ask your doctor.  · Call your local or state health department.  · Contact the Centers for Disease Control and Prevention (CDC):    Call 1-277.536.6369 (8-769-XIN-INFO)  or    Visit CDC's website at www.cdc.gov/vaccines  CDC Human Papillomavirus (HPV) Gardasil® (Interim) 5/17/13     This information is not intended to replace advice given to you by your health care provider. Make sure you discuss any questions you have with your health care provider.     Document Released: 10/15/2007 Document Revised: 01/08/2016 Document Reviewed: 01/29/2015  [employer] Interactive Patient Education ©2017 [employer]

## 2017-06-30 LAB — RPR SER QL: NORMAL

## 2017-07-05 LAB
DHEA-S SERPL-MCNC: 376 UG/DL (ref 110–431.7)
TESTOST FREE SERPL-MCNC: 1.8 PG/ML (ref 0–4.2)

## 2017-07-19 ENCOUNTER — OFFICE VISIT (OUTPATIENT)
Dept: OBSTETRICS AND GYNECOLOGY | Facility: CLINIC | Age: 22
End: 2017-07-19

## 2017-07-19 VITALS
WEIGHT: 293 LBS | SYSTOLIC BLOOD PRESSURE: 130 MMHG | BODY MASS INDEX: 51.91 KG/M2 | HEART RATE: 94 BPM | OXYGEN SATURATION: 97 % | RESPIRATION RATE: 16 BRPM | DIASTOLIC BLOOD PRESSURE: 90 MMHG | HEIGHT: 63 IN

## 2017-07-19 DIAGNOSIS — Z01.419 WELL WOMAN EXAM WITH ROUTINE GYNECOLOGICAL EXAM: Primary | ICD-10-CM

## 2017-07-19 DIAGNOSIS — Z30.41 ENCOUNTER FOR SURVEILLANCE OF CONTRACEPTIVE PILLS: ICD-10-CM

## 2017-07-19 DIAGNOSIS — K08.9 POOR DENTITION: ICD-10-CM

## 2017-07-19 DIAGNOSIS — E66.01 MORBID OBESITY DUE TO EXCESS CALORIES (HCC): ICD-10-CM

## 2017-07-19 PROCEDURE — 99385 PREV VISIT NEW AGE 18-39: CPT | Performed by: NURSE PRACTITIONER

## 2017-07-19 NOTE — PROGRESS NOTES
[facility] NEW PATIENT ANNUAL WELL WOMAN VISIT      Aleida Cano  [id number]  1995      Chief Complaint: Establish Care (No complaints)        History of present illness:    Aleida Cano is a 22 y.o. year old , new to our office, presenting to be seen for her annual exam. She is currently , accompanied by . States this is her first visit for GYN care other than OCP refills. She is taking a PNV as recommended by PCP. She has been on Sprintec since  for management of irregular menses. She is morbidly obese with a h/o periodic amenorrhea prior to OCPs. She admits to no routine health or dental care in her lifetime.     SEXUAL Hx:  She is currently sexually active.  In the past year there has not been new sexual partners.    Condoms are not typically used.  She would not like to be screened for STD's at today's exam.  Current birth control method: OCP (estrogen/progesterone): Sprintec  She is happy with her current method of contraception and does not want to discuss alternative methods of contraception.  MENSTRUAL Hx:  Patient's last menstrual period was 2017 (exact date).  In the past 6 months her cycles have been regular, predictable and occur monthly.   Her menstrual flow is normal.   Each month on average there are roughly 0 days of very heavy flow.    Intermenstrual bleeding is absent.    Post-coital bleeding is absent.  Dysmenorrhea: is not affecting her activities of daily living  PMS: is not affecting her activities of daily living  Her cycles are not a source of concern for her that she wishes to discuss today.  HEALTH Hx:  She exercises regularly: no (and has no plans to become more active).  She wears her seat belt:yes.  She has concerns about domestic violence: no.  She is a non-smoker.      History reviewed. No pertinent past medical history.    Past Surgical History:   Procedure Laterality Date   • ADENOIDECTOMY     • KNEE SURGERY     • TONSILLECTOMY    "  • TONSILLECTOMY AND ADENOIDECTOMY         MEDICATIONS: The current medication list was reviewed and reconciled.     Allergies:  has No Known Allergies.    Family History   Problem Relation Age of Onset   • Diabetes Mother    • Diabetes Father    • Hypertension Father        Health Maintenance:  She has never had a pap smear.     Review of Systems   Constitutional: Negative for fatigue, fever and unexpected weight change.   HENT: Negative for congestion, ear pain, hearing loss, sinus pressure and trouble swallowing.    Eyes: Positive for visual disturbance (glasses since age 11).   Respiratory: Negative for cough, chest tightness, shortness of breath and wheezing.    Cardiovascular: Negative for chest pain, palpitations and leg swelling.   Gastrointestinal: Negative for abdominal distention, abdominal pain, constipation, diarrhea, nausea and vomiting.   Endocrine: Negative for cold intolerance, heat intolerance, polydipsia, polyphagia and polyuria.   Genitourinary: Negative for difficulty urinating, dyspareunia, dysuria, frequency, hematuria, pelvic pain, urgency, vaginal bleeding, vaginal discharge and vaginal pain.   Musculoskeletal: Negative for arthralgias, gait problem, joint swelling and myalgias.   Skin: Negative for color change, pallor and rash.   Neurological: Negative for dizziness, seizures, syncope, weakness, light-headedness, numbness and headaches.   Hematological: Negative for adenopathy. Does not bruise/bleed easily.   Psychiatric/Behavioral: Negative for agitation, confusion, sleep disturbance and suicidal ideas. The patient is not nervous/anxious.        Physical Exam  Vital Signs: /90  Pulse 94  Resp 16  Ht 63\" (160 cm)  Wt (!) 356 lb (161 kg)  LMP 06/17/2017 (Exact Date)  SpO2 97%  Breastfeeding? No  BMI 63.06 kg/m2   General Appearance:  alert, cooperative, no apparent distress, appears stated age and obese   Neurologic/Psychiatric: A&O x 3, gait steady, appropriate affect "   HEENT:  Normocephalic, without obvious abnormality, mucous membranes moist. Poor dentition.    Neck: Supple, symmetrical, trachea midline, no adenopathy;  No thyromegaly, masses, or tenderness   Back:   Symmetric, no curvature, ROM normal, no CVA tenderness   Lungs:   Clear to auscultation bilaterally; respirations regular, even, and unlabored bilaterally   Heart:  Regular rate and rhythm, no murmurs appreciated   Breasts:  Symmetrical, no masses, no lesions and no nipple discharge   Abdomen:   Soft, non-tender, non-distended, no organomegaly and Exam limited d/t habitus.   Lymph nodes: No cervical, supraclavicular, inguinal or axillary adenopathy noted   Extremities: Normal, atraumatic; no clubbing, cyanosis, or edema    Skin: No rashes, ulcers, or suspicious lesions noted   Pelvic: External Genitalia  without lesions or skin changes  Vagina  is pink, moist, without lesions.   Cervix  normal, without lesions, no discharge, no CMT and pap obtained  Uterus  normal size, midposition, mobile, nontender and exam limited d/t habitus  Ovaries  without palpable masses or fullness and exam limited d/t habitus  Parametria  smooth  Rectovaginal  Female rectovaginal: deferred       Procedure Note:  No notes on file    Assessment and Plan:    Aleida was seen today for establish care.    Diagnoses and all orders for this visit:    Well woman exam with routine gynecological exam  -     Pap IG, Rfx HPV ASCU; Future  -     Pap IG, Rfx HPV ASCU    Encounter for surveillance of contraceptive pills    Morbid obesity due to excess calories    Poor dentition        Pap was done today.  If she does not receive the results of the Pap within 2 weeks  time, she was instructed to call to find out the results.  I am recommending several annual pap smears before going out to every 3 years due to lack of preventative care previously.     She was recommended to begin mammograms at age 40 for breast cancer screening, colonoscopy at age 50 for  colon cancer screening and bone density scans (DEXA) at age 60-65 for osteoporosis screening.    She was encouraged to see PCP for regular annual physical exams (not only for problems) and to visit a dentist for at least routine dental cleanings and examinations.     The patient was counseled today regarding weight loss. Options for weight control addressed with the patient included low carbohydrate diet, low sugar diet for diabetes control and encouraged daily exercise for weight loss. We reviewed the benefits of healthy weight and body mass index for overall health. The patient verbalized understanding of recommendations and expressed a desire for long-term goal of weight loss.       We discussed concepts and benefits of preventative care. Understanding may be somewhat limited, so I will continue to encourage routine health care at each visit.       Return in about 1 year (around 7/19/2018) for annual exam or PRN.      NELSON Ramirez      Note: Speech recognition transcription software was used to dictate portions of this document.  An attempt at proofreading has been made though minor errors in transcription may still be present.  Please do not hesitate to call our office with any questions.